# Patient Record
Sex: MALE | Race: WHITE | NOT HISPANIC OR LATINO | Employment: OTHER | URBAN - METROPOLITAN AREA
[De-identification: names, ages, dates, MRNs, and addresses within clinical notes are randomized per-mention and may not be internally consistent; named-entity substitution may affect disease eponyms.]

---

## 2017-06-06 NOTE — PATIENT DISCUSSION
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 S/P CE OU PT RECENTLY HAD EXAM WITH DR. SOLIS. - Plan : FOLLOW UP WITH DR. SOLIS AS SCHEDULED  1 YEAR EXAM WITH DR. Raoul Vargas

## 2017-06-06 NOTE — PATIENT DISCUSSION
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 TO CALL FOR MINOR EPILATION WITH ELLMAN LASER

## 2018-11-30 NOTE — PATIENT DISCUSSION
(U49.458) Other secondary cataract, bilateral - Assesment : Significant posterior capsule opacification present OD>OS  DISCUSSED RISKS AND BENEFITS OF YAG. THE RISK OF RETINAL PROBLEMS AFTER THE YAG IS MINIMAL BUT CAN HAPPEN.   IF PATIENT EXPERIENCES FLASHES OR FLOATERS AFTER THE YAG, HE SHOULD BE SEEN - Plan : YAG OD   RV 1-2 WEEKS AFTER YAG  DILATE OD

## 2019-01-03 NOTE — PATIENT DISCUSSION
(H26.137) Other secondary cataract, right eye - Assesment : RESOLVED - Plan : Monitor for Changes. Advised patient to call our office with decreased vision or increased symptoms.

## 2019-01-03 NOTE — PATIENT DISCUSSION
(H35.413) Lattice degeneration of retina, bilateral - Assesment : Examination revealed superior retinal lattice degeneration. - Plan : FOLLOW UP WITH DR. SOLIS AS SCHEDULED  11/2019 EXAM

## 2019-01-03 NOTE — PATIENT DISCUSSION
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

## 2020-12-15 ENCOUNTER — NEW PATIENT COMPREHENSIVE (OUTPATIENT)
Dept: URBAN - METROPOLITAN AREA CLINIC 38 | Facility: CLINIC | Age: 45
End: 2020-12-15

## 2020-12-15 DIAGNOSIS — H52.02: ICD-10-CM

## 2020-12-15 DIAGNOSIS — H52.203: ICD-10-CM

## 2020-12-15 DIAGNOSIS — H52.4: ICD-10-CM

## 2020-12-15 PROCEDURE — 92004 COMPRE OPH EXAM NEW PT 1/>: CPT

## 2020-12-15 PROCEDURE — 92015 DETERMINE REFRACTIVE STATE: CPT

## 2020-12-15 ASSESSMENT — VISUAL ACUITY
OD_SC: 20/40
OU_SC: 20/20
OU_SC: 20/20
OD_SC: 20/20
OS_SC: 20/20
OS_SC: 20/30

## 2020-12-15 ASSESSMENT — TONOMETRY
OD_IOP_MMHG: 16
OS_IOP_MMHG: 16

## 2023-01-26 NOTE — PATIENT DISCUSSION
Pt will call Dr. Paige Green office but will head over to the hospital .  VF from today given to pt to take to the hospital .

## 2023-01-26 NOTE — PATIENT DISCUSSION
Explained VF findings and pt's symptoms are typical for Stroke . Recommend following up with PCP , Dr. Case Rodgers or going right to the hospital for further stroke work up . Avoid driving in the meantime .

## 2024-05-21 ENCOUNTER — NEW PATIENT (OUTPATIENT)
Dept: URBAN - METROPOLITAN AREA CLINIC 43 | Facility: CLINIC | Age: 49
End: 2024-05-21

## 2024-05-21 DIAGNOSIS — H04.123: ICD-10-CM

## 2024-05-21 PROCEDURE — 92015 DETERMINE REFRACTIVE STATE: CPT

## 2024-05-21 PROCEDURE — 92004 COMPRE OPH EXAM NEW PT 1/>: CPT

## 2024-05-21 ASSESSMENT — TONOMETRY
OD_IOP_MMHG: 14
OS_IOP_MMHG: 15

## 2024-05-21 ASSESSMENT — VISUAL ACUITY
OD_SC: 20/25
OD_SC: J8
OD_CC: J2
OS_SC: 20/20
OS_CC: J2
OS_SC: J10
OD_CC: 20/30
OS_CC: 20/25-1